# Patient Record
Sex: FEMALE | Race: WHITE | NOT HISPANIC OR LATINO | Employment: FULL TIME | URBAN - METROPOLITAN AREA
[De-identification: names, ages, dates, MRNs, and addresses within clinical notes are randomized per-mention and may not be internally consistent; named-entity substitution may affect disease eponyms.]

---

## 2023-04-08 LAB
ALBUMIN (MG/L) IN URINE: NORMAL
ALBUMIN/CREATININE (UG/MG) IN URINE: NORMAL
ANION GAP IN SER/PLAS: 12 MMOL/L (ref 10–20)
CALCIUM (MG/DL) IN SER/PLAS: 9.2 MG/DL (ref 8.6–10.6)
CARBON DIOXIDE, TOTAL (MMOL/L) IN SER/PLAS: 24 MMOL/L (ref 21–32)
CHLORIDE (MMOL/L) IN SER/PLAS: 103 MMOL/L (ref 98–107)
CHOLESTEROL (MG/DL) IN SER/PLAS: 231 MG/DL (ref 0–199)
CHOLESTEROL IN HDL (MG/DL) IN SER/PLAS: 87.7 MG/DL
CHOLESTEROL/HDL RATIO: 2.6
CREATININE (MG/DL) IN SER/PLAS: 0.75 MG/DL (ref 0.5–1.05)
CREATININE (MG/DL) IN URINE: NORMAL
ESTIMATED AVERAGE GLUCOSE FOR HBA1C: 131 MG/DL
GFR FEMALE: >90 ML/MIN/1.73M2
GLUCOSE (MG/DL) IN SER/PLAS: 233 MG/DL (ref 74–99)
HEMOGLOBIN A1C/HEMOGLOBIN TOTAL IN BLOOD: 6.2 %
LDL: 113 MG/DL (ref 0–99)
POTASSIUM (MMOL/L) IN SER/PLAS: 6.4 MMOL/L (ref 3.5–5.3)
SODIUM (MMOL/L) IN SER/PLAS: 133 MMOL/L (ref 136–145)
THYROTROPIN (MIU/L) IN SER/PLAS BY DETECTION LIMIT <= 0.05 MIU/L: 0.5 MIU/L (ref 0.44–3.98)
TRIGLYCERIDE (MG/DL) IN SER/PLAS: 151 MG/DL (ref 0–149)
UREA NITROGEN (MG/DL) IN SER/PLAS: 7 MG/DL (ref 6–23)
VLDL: 30 MG/DL (ref 0–40)

## 2023-04-10 LAB
ANION GAP IN SER/PLAS: 10 MMOL/L (ref 10–20)
CALCIUM (MG/DL) IN SER/PLAS: 9.6 MG/DL (ref 8.6–10.6)
CARBON DIOXIDE, TOTAL (MMOL/L) IN SER/PLAS: 27 MMOL/L (ref 21–32)
CHLORIDE (MMOL/L) IN SER/PLAS: 103 MMOL/L (ref 98–107)
CHOLESTEROL (MG/DL) IN SER/PLAS: 212 MG/DL (ref 0–199)
CHOLESTEROL IN HDL (MG/DL) IN SER/PLAS: 89.1 MG/DL
CHOLESTEROL/HDL RATIO: 2.4
CREATININE (MG/DL) IN SER/PLAS: 0.75 MG/DL (ref 0.5–1.05)
GFR FEMALE: >90 ML/MIN/1.73M2
GLUCOSE (MG/DL) IN SER/PLAS: 177 MG/DL (ref 74–99)
LDL: 103 MG/DL (ref 0–99)
POTASSIUM (MMOL/L) IN SER/PLAS: 4.1 MMOL/L (ref 3.5–5.3)
SODIUM (MMOL/L) IN SER/PLAS: 136 MMOL/L (ref 136–145)
THYROTROPIN (MIU/L) IN SER/PLAS BY DETECTION LIMIT <= 0.05 MIU/L: 0.41 MIU/L (ref 0.44–3.98)
TRIGLYCERIDE (MG/DL) IN SER/PLAS: 102 MG/DL (ref 0–149)
UREA NITROGEN (MG/DL) IN SER/PLAS: 14 MG/DL (ref 6–23)
VLDL: 20 MG/DL (ref 0–40)

## 2023-04-11 LAB
ESTIMATED AVERAGE GLUCOSE FOR HBA1C: 137 MG/DL
HEMOGLOBIN A1C/HEMOGLOBIN TOTAL IN BLOOD: 6.4 %

## 2023-11-07 PROBLEM — E10.9 TYPE 1 DIABETES MELLITUS (MULTI): Status: ACTIVE | Noted: 2023-11-07

## 2023-11-07 PROBLEM — R31.9 HEMATURIA: Status: ACTIVE | Noted: 2023-11-07

## 2023-11-07 PROBLEM — N94.6 DYSMENORRHEA: Status: ACTIVE | Noted: 2023-11-07

## 2023-11-07 PROBLEM — Z97.5 IUD (INTRAUTERINE DEVICE) IN PLACE: Status: ACTIVE | Noted: 2023-11-07

## 2023-11-07 RX ORDER — COPPER 313.4 MG/1
INTRAUTERINE DEVICE INTRAUTERINE
COMMUNITY

## 2023-11-07 RX ORDER — INSULIN LISPRO 100 [IU]/ML
INJECTION, SOLUTION INTRAVENOUS; SUBCUTANEOUS
COMMUNITY
Start: 2012-12-28 | End: 2023-11-08 | Stop reason: SDUPTHER

## 2023-11-07 RX ORDER — BLOOD SUGAR DIAGNOSTIC
STRIP MISCELLANEOUS
COMMUNITY
Start: 2021-05-03

## 2023-11-07 RX ORDER — INSULIN ASPART 100 [IU]/ML
INJECTION, SOLUTION INTRAVENOUS; SUBCUTANEOUS
COMMUNITY
Start: 2022-10-21 | End: 2023-11-08 | Stop reason: WASHOUT

## 2023-11-07 RX ORDER — PEN NEEDLE, DIABETIC 29 G X1/2"
NEEDLE, DISPOSABLE MISCELLANEOUS
COMMUNITY
Start: 2022-12-14 | End: 2023-11-08 | Stop reason: SDUPTHER

## 2023-11-07 RX ORDER — INSULIN DEGLUDEC 100 U/ML
INJECTION, SOLUTION SUBCUTANEOUS
COMMUNITY
Start: 2022-08-08 | End: 2023-11-08 | Stop reason: WASHOUT

## 2023-11-08 ENCOUNTER — OFFICE VISIT (OUTPATIENT)
Dept: ENDOCRINOLOGY | Facility: CLINIC | Age: 28
End: 2023-11-08
Payer: COMMERCIAL

## 2023-11-08 ENCOUNTER — LAB (OUTPATIENT)
Dept: LAB | Facility: LAB | Age: 28
End: 2023-11-08
Payer: COMMERCIAL

## 2023-11-08 VITALS
BODY MASS INDEX: 22.86 KG/M2 | DIASTOLIC BLOOD PRESSURE: 70 MMHG | HEIGHT: 63 IN | SYSTOLIC BLOOD PRESSURE: 122 MMHG | WEIGHT: 129 LBS

## 2023-11-08 DIAGNOSIS — Z13.29 SCREENING FOR THYROID DISORDER: ICD-10-CM

## 2023-11-08 DIAGNOSIS — E10.9 TYPE 1 DIABETES MELLITUS WITHOUT COMPLICATION (MULTI): ICD-10-CM

## 2023-11-08 PROBLEM — Z79.4 LONG-TERM INSULIN USE (MULTI): Status: ACTIVE | Noted: 2023-11-08

## 2023-11-08 LAB — VIT B12 SERPL-MCNC: 329 PG/ML (ref 211–911)

## 2023-11-08 PROCEDURE — 3074F SYST BP LT 130 MM HG: CPT | Performed by: INTERNAL MEDICINE

## 2023-11-08 PROCEDURE — 86364 TISS TRNSGLTMNASE EA IG CLAS: CPT

## 2023-11-08 PROCEDURE — 1036F TOBACCO NON-USER: CPT | Performed by: INTERNAL MEDICINE

## 2023-11-08 PROCEDURE — 86258 DGP ANTIBODY EACH IG CLASS: CPT

## 2023-11-08 PROCEDURE — 83036 HEMOGLOBIN GLYCOSYLATED A1C: CPT

## 2023-11-08 PROCEDURE — 82607 VITAMIN B-12: CPT

## 2023-11-08 PROCEDURE — 99204 OFFICE O/P NEW MOD 45 MIN: CPT | Performed by: INTERNAL MEDICINE

## 2023-11-08 PROCEDURE — 36415 COLL VENOUS BLD VENIPUNCTURE: CPT

## 2023-11-08 PROCEDURE — 95251 CONT GLUC MNTR ANALYSIS I&R: CPT | Performed by: INTERNAL MEDICINE

## 2023-11-08 PROCEDURE — 3044F HG A1C LEVEL LT 7.0%: CPT | Performed by: INTERNAL MEDICINE

## 2023-11-08 PROCEDURE — 86376 MICROSOMAL ANTIBODY EACH: CPT

## 2023-11-08 PROCEDURE — 3078F DIAST BP <80 MM HG: CPT | Performed by: INTERNAL MEDICINE

## 2023-11-08 RX ORDER — INSULIN GLARGINE 100 [IU]/ML
INJECTION, SOLUTION SUBCUTANEOUS EVERY 24 HOURS
COMMUNITY
End: 2023-11-08 | Stop reason: DRUGHIGH

## 2023-11-08 RX ORDER — BLOOD SUGAR DIAGNOSTIC
1 STRIP MISCELLANEOUS DAILY
Qty: 50 EACH | Refills: 10 | Status: SHIPPED | OUTPATIENT
Start: 2023-11-08 | End: 2024-11-07

## 2023-11-08 RX ORDER — INSULIN GLARGINE 100 [IU]/ML
20 INJECTION, SOLUTION SUBCUTANEOUS EVERY MORNING
Qty: 6 ML | Refills: 5 | Status: SHIPPED | OUTPATIENT
Start: 2023-11-08 | End: 2024-03-16 | Stop reason: SDUPTHER

## 2023-11-08 RX ORDER — CALCIUM CARB/VITAMIN D3/VIT K1 500-100-40
1 TABLET,CHEWABLE ORAL 3 TIMES DAILY
Qty: 100 EACH | Refills: 10 | Status: SHIPPED | OUTPATIENT
Start: 2023-11-08 | End: 2024-11-07

## 2023-11-08 RX ORDER — INSULIN LISPRO 100 [IU]/ML
INJECTION, SOLUTION INTRAVENOUS; SUBCUTANEOUS
Qty: 10 ML | Refills: 11 | Status: SHIPPED | OUTPATIENT
Start: 2023-11-08

## 2023-11-08 NOTE — PROGRESS NOTES
"  Subjective   Jennifer Hurst is a 28 y.o. female who presents for an initial visit for evaluation of Type 1 diabetes mellitus. The initial diagnosis of diabetes was made at age 12 years. The patient’s diagnosis of diabetes mellitus was based on a blood test obtain by her pediatrician which was markedly elevated.  Both of her grandmothers were diabetics but had type II disease.    She was previously seen an endocrinologist in [place] but they have since retired.    Known complications due to diabetes included none    Cardiovascular risk factors include diabetes mellitus. The patient is not on an ACE inhibitor or angiotensin II receptor blocker.  The patient has not been previously hospitalized due to diabetic ketoacidosis.     Current symptoms/problems include none. Her clinical course has been stable.     Current diabetes regimen:  Insulin glargine 28 units daily  NovoLog 1 unit for every 10 g of carbs plus correction scale     The patient is currently checking the blood glucose up to 34 times per day.    Patient is using: continuous glucose monitor  [employer] ALY CGM DATA:  Average 141 mg/dL  8% of values below target range  69% of values within target range  23% of values above target range    Hypoglycemia frequency: 8%  Hypoglycemia awareness: Yes     MEALS:  Breakfast - yogurt, banna   Lunch - salad, leftovers   Dinner - tacos, burgers, pasta, soup, garlic bread, chckien   Snacks - frutit, goldfish crackers   Beverages- coffee, tea with creamer     Noctuira x 1     She is not keen on insulin pump therapy    She is currently engaged and will get  in 2025    Exercise regimen - walks dog for 60 minutes     Objective   /70 (BP Location: Left arm, Patient Position: Sitting, BP Cuff Size: Adult)   Ht 1.6 m (5' 3\")   Wt 58.5 kg (129 lb)   BMI 22.85 kg/m²   Physical Exam  Vitals and nursing note reviewed.   Constitutional:       General: She is not in acute distress.     Appearance: Normal " "appearance. She is normal weight.   HENT:      Head: Normocephalic and atraumatic.      Nose: Nose normal.      Mouth/Throat:      Mouth: Mucous membranes are moist.   Eyes:      Extraocular Movements: Extraocular movements intact.   Neck:      Thyroid: No thyromegaly or thyroid tenderness.   Cardiovascular:      Rate and Rhythm: Normal rate and regular rhythm.   Pulmonary:      Effort: Pulmonary effort is normal.      Breath sounds: Normal breath sounds.   Musculoskeletal:         General: Normal range of motion.   Skin:     General: Skin is warm.   Neurological:      Mental Status: She is alert and oriented to person, place, and time.   Psychiatric:         Mood and Affect: Mood normal.         Lab Review  Lab Results   Component Value Date    HGBA1C 6.4 (A) 04/10/2023     Lab Results   Component Value Date    GLUCOSE 177 (H) 04/10/2023    CALCIUM 9.6 04/10/2023     04/10/2023    K 4.1 04/10/2023    CO2 27 04/10/2023     04/10/2023    BUN 14 04/10/2023    CREATININE 0.75 04/10/2023     Lab Results   Component Value Date    CHOL 212 (H) 04/10/2023    CHOL 231 (H) 04/08/2023    CHOL 231 (H) 06/01/2019     Lab Results   Component Value Date    HDL 89.1 04/10/2023    HDL 87.7 04/08/2023    HDL 78.1 06/01/2019     No results found for: \"LDLCALC\"  Lab Results   Component Value Date    TRIG 102 04/10/2023    TRIG 151 (H) 04/08/2023    TRIG 248 (H) 06/01/2019         Health Maintenance:   Foot Exam:   Eye Exam:  December 2022  Lipid Panel:  April 8, 2023  Urine Albumin:    Assessment/Plan   28-year-old female presents for the evaluation for the management of type 1 diabetes. Her blood pressure is at goal today.    Type 1 diabetes mellitus (CMS/[facility])  Insulin Glargine 28 units subcutaneous daily   Novolog 1 unit for every 10 grams  Correction scale 1 unit for every 50mg above 150mg/dL   Please wear a medical alert at all times   To continue the use of your CGM for the intensive glucose monitoring due to the " dynamic nature of insulin requirements, the narrow therapeutic index of insulin and the potentially fatal consequences of treatment  Counseled that the degree of allowance for hypoglycemia is 4%  To obtain blood and urine tests     Long-term insulin use (CMS/[facility])  Please rotate insulin injection sites     For follow up in 4 months

## 2023-11-08 NOTE — PATIENT INSTRUCTIONS
Thank you for choosing [facility]  for your health care needs.  If you have any questions, concerns or medical needs, please feel free to contact our office at [phone].    Please ensure you complete your blood work one week before the next scheduled appointment.    Insulin Glargine 28 units subcutaneous daily   Novolog 1 unit for every 10 grams  Correction scale 1 unit for every 50mg above 150mg/dL   Please wear a medical alert at all times   To continue the use of your CGM for the intensive glucose monitoring due to the dynamic nature of insulin requirements, the narrow therapeutic index of insulin and the potentially fatal consequences of treatment  To obtain blood and urine tests   Counseled that the goals of diabetic care will change in pregnancy   For follow up in 4 months

## 2023-11-09 ENCOUNTER — TELEPHONE (OUTPATIENT)
Dept: ENDOCRINOLOGY | Facility: CLINIC | Age: 28
End: 2023-11-09
Payer: COMMERCIAL

## 2023-11-09 LAB
EST. AVERAGE GLUCOSE BLD GHB EST-MCNC: 134 MG/DL
GLIADIN PEPTIDE IGA SER IA-ACNC: <1 U/ML
GLIADIN PEPTIDE IGG SER IA-ACNC: <1 U/ML
HBA1C MFR BLD: 6.3 %
THYROPEROXIDASE AB SERPL-ACNC: 303 IU/ML
TTG IGA SER IA-ACNC: <1 U/ML
TTG IGG SER IA-ACNC: <1 U/ML

## 2023-11-09 NOTE — ASSESSMENT & PLAN NOTE
Insulin Glargine 28 units subcutaneous daily   Novolog 1 unit for every 10 grams  Correction scale 1 unit for every 50mg above 150mg/dL   Please wear a medical alert at all times   To continue the use of your CGM for the intensive glucose monitoring due to the dynamic nature of insulin requirements, the narrow therapeutic index of insulin and the potentially fatal consequences of treatment  Counseled that the degree of allowance for hypoglycemia is 4%  To obtain blood and urine tests

## 2023-11-09 NOTE — TELEPHONE ENCOUNTER
Client services called to advise that they could not run the urine albumin specimen.  There was not enough urine to run the test.

## 2023-11-10 ENCOUNTER — TELEPHONE (OUTPATIENT)
Dept: ENDOCRINOLOGY | Facility: CLINIC | Age: 28
End: 2023-11-10
Payer: COMMERCIAL

## 2023-11-10 NOTE — TELEPHONE ENCOUNTER
----- Message from Jordan Woodard MD sent at 11/10/2023  5:58 AM EST -----  Labs have been reviewed.  The A1C was found to be 6.3%.  Testing for celiac disease was negative. Testing for Hashimoto's thyroiditis was positive.  This predisposes to developing hypothyroidism over time.  Thyroid levels will continue to be monitored serially.  For follow up as scheduled.

## 2023-11-10 NOTE — TELEPHONE ENCOUNTER
Result Communication    Resulted Orders   Hemoglobin A1C   Result Value Ref Range    Hemoglobin A1C 6.3 (H) see below %    Estimated Average Glucose 134 Not Established mg/dL    Narrative    Diagnosis of Diabetes-Adults  Non-Diabetic: < or = 5.6%  Increased risk for developing diabetes: 5.7-6.4%  Diagnostic of diabetes: > or = 6.5%    Monitoring of Diabetes  Age (y)....................... Therapeutic Goal (%)  Adults: >18.........................<7.0  Pediatrics: 13-18...................<7.5  Pediatrics: 7-12....................<8.0  Pediatrics: 0-6..................... 7.5-8.5    American Diabetes Association. Diabetes Care 33(S1), Jan 2010       Celiac Panel   Result Value Ref Range    Tissue Transglutaminase, IgA <1.0 <15.0 U/mL      Comment:      Celiac disease is unlikely. False negative Tissue  Transglutaminase  Antibody, IgA results can occur in  approximately 10% of patients with celiac disease,  patients already adhering to a gluten-free diet, or  patients with IgA deficiency.    Tissue Transglutamase IgG <1.0 <15.0 U/mL      Comment:      False negative Tissue Transglutaminase Antibody, IgG   results can occur in patients already adhering to a   gluten-free diet. Tissue Transglutaminase Antibody,   IgA is the preferred test for screening patients with   suspected Celiac Disease.    Deamidated Gliadin Antibody IgA <1.0 <15.0 U/mL      Comment:      False negative Deamidated Gliadin Peptide Antibody, IgA   results can occur in patients already adhering to a   gluten-free diet or patients with IgA deficiency.   Tissue Transglutaminase Antibody, IgA is the preferred   test for screening patients with suspected Celiac Disease.            Deamidated Gliadin Antibody IgG <1.0 <15.0 U/mL      Comment:      False negative Deamidated Gliadin Peptide Antibody,   IgG results can occur in patients already adhering   to a gluten-free diet. Tissue Transglutaminase   Antibody, IgA is the preferred test for screening    patients with suspected Celiac Disease.   Vitamin B12   Result Value Ref Range    Vitamin B12 329 211 - 911 pg/mL   TPO antibody   Result Value Ref Range    Thyroid Peroxidase (TPO) Antibody 303 (H) <=60 IU/mL    Narrative    Negative: <=60 U/mL  Positive:  >60 U/mL       2:03 PM      Results were successfully communicated with the patient and they acknowledged their understanding.

## 2023-11-10 NOTE — RESULT ENCOUNTER NOTE
Labs have been reviewed.  The A1C was found to be 6.3%.  Testing for celiac disease was negative. Testing for Hashimoto's thyroiditis was positive.  This predisposes to developing hypothyroidism over time.  Thyroid levels will continue to be monitored serially.  For follow up as scheduled.

## 2023-11-17 NOTE — TELEPHONE ENCOUNTER
Patient previously requested that Dr. Woodard contact her directly to further explain her recent diagnosis. She would also like to know if she will be starting medication at this time. She states medication was discussed at the time of her appointment but was not mentioned when lab results were relayed to her. She says she has called twice since last week with no response.

## 2023-11-22 ENCOUNTER — TELEPHONE (OUTPATIENT)
Dept: ENDOCRINOLOGY | Facility: CLINIC | Age: 28
End: 2023-11-22
Payer: COMMERCIAL

## 2023-12-05 NOTE — TELEPHONE ENCOUNTER
Patient called.  NO medication warranted as of yet as TSH values have been normal.  Thyroid replacement therapy warranted if the TSH starts to elevate, conferring a state of hypothyroidism.    Understanding expressed.

## 2023-12-08 ENCOUNTER — OFFICE VISIT (OUTPATIENT)
Dept: PRIMARY CARE | Facility: CLINIC | Age: 28
End: 2023-12-08
Payer: COMMERCIAL

## 2023-12-08 ENCOUNTER — LAB (OUTPATIENT)
Dept: LAB | Facility: LAB | Age: 28
End: 2023-12-08
Payer: COMMERCIAL

## 2023-12-08 VITALS
BODY MASS INDEX: 23.39 KG/M2 | WEIGHT: 132 LBS | HEART RATE: 84 BPM | RESPIRATION RATE: 16 BRPM | SYSTOLIC BLOOD PRESSURE: 118 MMHG | TEMPERATURE: 97.9 F | DIASTOLIC BLOOD PRESSURE: 80 MMHG | HEIGHT: 63 IN

## 2023-12-08 DIAGNOSIS — R76.8 ANTI-TPO ANTIBODIES PRESENT: Primary | ICD-10-CM

## 2023-12-08 DIAGNOSIS — E10.9 TYPE 1 DIABETES MELLITUS WITHOUT COMPLICATION (MULTI): ICD-10-CM

## 2023-12-08 DIAGNOSIS — R76.8 ANTI-TPO ANTIBODIES PRESENT: ICD-10-CM

## 2023-12-08 LAB
T4 FREE SERPL-MCNC: 0.79 NG/DL (ref 0.61–1.12)
TSH SERPL-ACNC: 0.8 MIU/L (ref 0.44–3.98)

## 2023-12-08 PROCEDURE — 84481 FREE ASSAY (FT-3): CPT

## 2023-12-08 PROCEDURE — 99214 OFFICE O/P EST MOD 30 MIN: CPT | Performed by: FAMILY MEDICINE

## 2023-12-08 PROCEDURE — 84439 ASSAY OF FREE THYROXINE: CPT

## 2023-12-08 PROCEDURE — 84443 ASSAY THYROID STIM HORMONE: CPT

## 2023-12-08 PROCEDURE — 3044F HG A1C LEVEL LT 7.0%: CPT | Performed by: FAMILY MEDICINE

## 2023-12-08 PROCEDURE — 36415 COLL VENOUS BLD VENIPUNCTURE: CPT

## 2023-12-08 PROCEDURE — 3074F SYST BP LT 130 MM HG: CPT | Performed by: FAMILY MEDICINE

## 2023-12-08 PROCEDURE — 3079F DIAST BP 80-89 MM HG: CPT | Performed by: FAMILY MEDICINE

## 2023-12-08 PROCEDURE — 1036F TOBACCO NON-USER: CPT | Performed by: FAMILY MEDICINE

## 2023-12-08 RX ORDER — BLOOD-GLUCOSE SENSOR
EACH MISCELLANEOUS
COMMUNITY
Start: 2023-11-08

## 2023-12-08 NOTE — PROGRESS NOTES
Subjective   Patient ID: Jennifer Hurst is a 28 y.o. female who presents for New Patient Visit (New to est/Seen endo for Hashimoto's ).  HPI  Patient with hx of type 1 diabetes   Here to establish care   She tested positive to TPO antibodies and she is worried about hashimoto.       Billcecilio sepcialist.       Review of Systems    Past Medical History:   Diagnosis Date    Encounter for gynecological examination (general) (routine) without abnormal findings 02/07/2018    Well female exam with routine gynecological exam    Pain in unspecified foot 06/15/2016    Foot pain    Personal history of other diseases of the nervous system and sense organs     History of migraine    Personal history of other diseases of the respiratory system     History of bronchitis    Personal history of other endocrine, nutritional and metabolic disease     History of diabetes mellitus    Personal history of other infectious and parasitic diseases     History of chickenpox    Toxic effect of contact with other venomous marine animals, accidental (unintentional), initial encounter 05/31/2016    Marine animal sting       History reviewed. No pertinent surgical history.   Social History     Socioeconomic History    Marital status: Single     Spouse name: None    Number of children: None    Years of education: None    Highest education level: None   Occupational History    None   Tobacco Use    Smoking status: Never     Passive exposure: Never    Smokeless tobacco: Never   Substance and Sexual Activity    Alcohol use: Yes     Alcohol/week: 2.0 standard drinks of alcohol     Types: 2 Glasses of wine per week     Comment: occasionally    Drug use: Not Currently    Sexual activity: None   Other Topics Concern    None   Social History Narrative    None     Social Determinants of Health     Financial Resource Strain: Not on file   Food Insecurity: Not on file   Transportation Needs: Not on file   Physical Activity: Not on file   Stress: Not on file  "  Social Connections: Not on file   Intimate Partner Violence: Not on file   Housing Stability: Not on file      Family History   Problem Relation Name Age of Onset    Anemia Mother      Prostate cancer Maternal Grandfather      Diabetes Paternal Grandmother      Prostate cancer Other grandfather        MEDICATIONS AND ALLERGIES:    ALLERGIES Cephalexin    MEDICATIONS   Current Outpatient Medications on File Prior to Visit   Medication Sig Dispense Refill    FreeStyle Pretty 3 Sensor device Use as instructed. Change every 14 days      FreeStyle Pretty sensor system kit Use as instructed.  Change every 14 days 2 each 11    HumaLOG U-100 Insulin 100 unit/mL injection Inject 1 unit for every 10 grams.  Max of 50 units per day 10 mL 11    insulin glargine (Lantus) 100 unit/mL (3 mL) pen Inject 20 Units under the skin once daily in the morning. 6 mL 5    insulin syringe-needle U-100 (BD Insulin Syringe Ultra-Fine) 31G X 5/16\" 0.3 mL syringe Inject 1 each under the skin 3 times a day. 100 each 10    OneTouch Ultra Test strip USE 1 STRIP THREE TIMES A DAY      pen needle, diabetic 32 gauge x 3/16\" needle 1 each once daily. 50 each 10    copper (ParaGard T 380A) 380 square mm IUD Paragard Intrauterine Copper Intrauterine Intrauterine Device   Refills: 0       Active       No current facility-administered medications on file prior to visit.        Objective   Visit Vitals  Resp 16   Ht 1.6 m (5' 3\")   Wt 59.9 kg (132 lb)   BMI 23.38 kg/m²   Smoking Status Never   BSA 1.63 m²      Physical Exam  Constitutional:       Appearance: Normal appearance.   HENT:      Head: Normocephalic and atraumatic.   Eyes:      Pupils: Pupils are equal, round, and reactive to light.   Cardiovascular:      Rate and Rhythm: Normal rate.   Pulmonary:      Effort: Pulmonary effort is normal.   Musculoskeletal:         General: No swelling.      Cervical back: Normal range of motion.   Skin:     Coloration: Skin is not jaundiced.   Neurological:      " General: No focal deficit present.      Mental Status: She is alert and oriented to person, place, and time.             1. Anti-TPO antibodies present  Will rechecek thyroid function   If normal she will need periodic checks   We provided reading information   This can be positive in patient with type one diabetes 30-40%   - TSH; Future  - Thyroxine, Free; Future  - Triiodothyronine, Free; Future    2. Type 1 diabetes mellitus without complication (CMS/HCC)  Since she was 12   Well controlled   Following with endocirniolgoy

## 2023-12-09 LAB — T3FREE SERPL-MCNC: 3.6 PG/ML (ref 2.3–4.2)

## 2023-12-11 ENCOUNTER — TELEPHONE (OUTPATIENT)
Dept: PRIMARY CARE | Facility: CLINIC | Age: 28
End: 2023-12-11
Payer: COMMERCIAL

## 2023-12-11 NOTE — TELEPHONE ENCOUNTER
----- Message from Mat Grove MD sent at 12/11/2023  8:20 AM EST -----  Thyroid function normal   No indication that her symptoms are related to Hashimoto's , recommend recheck level in 3 month or if she develops new symptoms.

## 2024-03-14 NOTE — PATIENT INSTRUCTIONS
Thank you for choosing [facility]  for your health care needs.  If you have any questions, concerns or medical needs, please feel free to contact our office at [phone].    Please ensure you complete your blood work one week before the next scheduled appointment.    To decrease Insulin Glargine to 26  units subcutaneous daily   Novolog 1 unit for every 10 grams  Correction scale 1 unit for every 50mg above 150mg/dL   To continue the use of your CGM for the intensive glucose monitoring due to the dynamic nature of insulin requirements, the narrow therapeutic index of insulin and the potentially fatal consequences of treatment  For follow up in 4 months

## 2024-03-14 NOTE — PROGRESS NOTES
"  Subjective   Jennifer Hurst is a 28 y.o. female who presents for follow up for Type 1 diabetes mellitus. The initial diagnosis of diabetes was made at age 12 years. The patient’s diagnosis of diabetes mellitus was based on a blood test obtain by her pediatrician which was markedly elevated.  Both of her grandmothers were diabetics but had type II disease.    She has had no major changes to her health since her last appointment.    Known complications due to diabetes included none    Cardiovascular risk factors include diabetes mellitus. The patient is not on an ACE inhibitor or angiotensin II receptor blocker.  The patient has not been previously hospitalized due to diabetic ketoacidosis.     Current symptoms/problems include none. Her clinical course has been stable.     Current diabetes regimen:  Insulin glargine 28 units daily  NovoLog 1 unit for every 10 g of carbs plus correction scale     The patient is currently checking the blood glucose multiple times per day.    Patient is using: continuous glucose monitor                                                      Hypoglycemia frequency: 6%  Hypoglycemia awareness: Yes     MEALS:  Breakfast - yogurt, banna   Lunch - salad, leftovers   Dinner - tacos, burgers, pasta, soup, garlic bread, chckien, rice, pasta   Snacks - frutit, goldfish crackers   Beverages- coffee, tea with creamer     She is not keen on insulin pump therapy    She is currently engaged and will get  in 2025    Exercise regimen -walks dog    Review of Systems   Genitourinary:         Nocturia x 1    All other systems reviewed and are negative.    Objective   /64 (BP Location: Left arm, Patient Position: Sitting, BP Cuff Size: Adult)   Pulse 89   Ht 1.6 m (5' 3\")   Wt 59.9 kg (132 lb)   BMI 23.38 kg/m²   Physical Exam  Vitals and nursing note reviewed.   Constitutional:       General: She is not in acute distress.     Appearance: Normal appearance. She is normal weight.   HENT: " "     Head: Normocephalic and atraumatic.      Nose: Nose normal.      Mouth/Throat:      Mouth: Mucous membranes are moist.   Eyes:      Extraocular Movements: Extraocular movements intact.   Neck:      Thyroid: No thyromegaly or thyroid tenderness.   Cardiovascular:      Rate and Rhythm: Normal rate and regular rhythm.   Pulmonary:      Effort: Pulmonary effort is normal.      Breath sounds: Normal breath sounds.   Musculoskeletal:         General: Normal range of motion.   Skin:     General: Skin is warm.   Neurological:      Mental Status: She is alert and oriented to person, place, and time.   Psychiatric:         Mood and Affect: Mood normal.         Lab Review  Lab Results   Component Value Date    HGBA1C 6.0 03/15/2024     Lab Results   Component Value Date    GLUCOSE 177 (H) 04/10/2023    CALCIUM 9.6 04/10/2023     04/10/2023    K 4.1 04/10/2023    CO2 27 04/10/2023     04/10/2023    BUN 14 04/10/2023    CREATININE 0.75 04/10/2023     Lab Results   Component Value Date    CHOL 212 (H) 04/10/2023    CHOL 231 (H) 04/08/2023    CHOL 231 (H) 06/01/2019     Lab Results   Component Value Date    HDL 89.1 04/10/2023    HDL 87.7 04/08/2023    HDL 78.1 06/01/2019     No results found for: \"LDLCALC\"  Lab Results   Component Value Date    TRIG 102 04/10/2023    TRIG 151 (H) 04/08/2023    TRIG 248 (H) 06/01/2019         Health Maintenance:   Foot Exam: December 2023  Eye Exam:  December 2022  Urine Albumin: April 8, 2023    CGM Interpretation/Plan   14 day CGM download was reviewed in detail as documented above and will be attached to chart.  A minimum of 72 hours of glucose data was used to inform the management plan outlined below.  61% of values is spent within target range.  6% of values is hypoglycemic.  Hypoglycemia can occur overnight or 1-2 hours following a correction for high blood sugar.    Assessment/Plan   28-year-old female presents for follow up for type 1 diabetes. Her blood pressure is at " goal today.    Type 1 diabetes mellitus (CMS/[facility])  To decrease Insulin Glargine to 26  units subcutaneous daily   Novolog 1 unit for every 10 grams  Correction scale 1 unit for every 50mg above 150mg/dL   To continue the use of your CGM for the intensive glucose monitoring due to the dynamic nature of insulin requirements, the narrow therapeutic index of insulin and the potentially fatal consequences of treatment  Counseled that the time in range should be >70%  Counseled that the degree of allowance for hypoglycemia should be 4% or less  Counseled that we would not want to achieve a very good A1c value at the expense of recurrent hypoglycemia      Long-term insulin use (CMS/[facility])  Please rotate insulin injection sites        Hashimoto's thyroiditis  Counseled that the antibody was positive for Hashimoto's thyroiditis  Counseled that thyroid replacement therapy is not warranted at this time given normal TSH values  Counseled that there is a predisposition to developing overt hypothyroidism over time    For follow up in 4 months

## 2024-03-15 ENCOUNTER — OFFICE VISIT (OUTPATIENT)
Dept: ENDOCRINOLOGY | Facility: CLINIC | Age: 29
End: 2024-03-15
Payer: COMMERCIAL

## 2024-03-15 VITALS
WEIGHT: 132 LBS | DIASTOLIC BLOOD PRESSURE: 64 MMHG | HEIGHT: 63 IN | SYSTOLIC BLOOD PRESSURE: 102 MMHG | BODY MASS INDEX: 23.39 KG/M2 | HEART RATE: 89 BPM

## 2024-03-15 DIAGNOSIS — E10.9 TYPE 1 DIABETES MELLITUS WITHOUT COMPLICATION (MULTI): Primary | ICD-10-CM

## 2024-03-15 LAB — POC HEMOGLOBIN A1C: 6 % (ref 4.2–6.5)

## 2024-03-15 PROCEDURE — 1036F TOBACCO NON-USER: CPT | Performed by: INTERNAL MEDICINE

## 2024-03-15 PROCEDURE — 3078F DIAST BP <80 MM HG: CPT | Performed by: INTERNAL MEDICINE

## 2024-03-15 PROCEDURE — 99214 OFFICE O/P EST MOD 30 MIN: CPT | Performed by: INTERNAL MEDICINE

## 2024-03-15 PROCEDURE — 95251 CONT GLUC MNTR ANALYSIS I&R: CPT | Performed by: INTERNAL MEDICINE

## 2024-03-15 PROCEDURE — 83036 HEMOGLOBIN GLYCOSYLATED A1C: CPT | Performed by: INTERNAL MEDICINE

## 2024-03-15 PROCEDURE — 3074F SYST BP LT 130 MM HG: CPT | Performed by: INTERNAL MEDICINE

## 2024-03-16 PROBLEM — E06.3 HASHIMOTO'S THYROIDITIS: Status: ACTIVE | Noted: 2024-03-16

## 2024-03-16 RX ORDER — INSULIN GLARGINE 100 [IU]/ML
26 INJECTION, SOLUTION SUBCUTANEOUS EVERY MORNING
Qty: 10 EACH | Refills: 5 | Status: SHIPPED | OUTPATIENT
Start: 2024-03-16 | End: 2024-09-12

## 2024-03-16 NOTE — ASSESSMENT & PLAN NOTE
Counseled that the antibody was positive for Hashimoto's thyroiditis  Counseled that thyroid replacement therapy is not warranted at this time given normal TSH values  Counseled that there is a predisposition to developing overt hypothyroidism over time    For follow up in 4 months

## 2024-03-16 NOTE — ASSESSMENT & PLAN NOTE
To decrease Insulin Glargine to 26  units subcutaneous daily   Novolog 1 unit for every 10 grams  Correction scale 1 unit for every 50mg above 150mg/dL   To continue the use of your CGM for the intensive glucose monitoring due to the dynamic nature of insulin requirements, the narrow therapeutic index of insulin and the potentially fatal consequences of treatment  Counseled that the time in range should be >70%  Counseled that the degree of allowance for hypoglycemia should be 4% or less  Counseled that we would not want to achieve a very good A1c value at the expense of recurrent hypoglycemia

## 2024-03-18 DIAGNOSIS — E10.9 TYPE 1 DIABETES MELLITUS WITHOUT COMPLICATION (MULTI): ICD-10-CM

## 2024-03-18 RX ORDER — INSULIN GLARGINE 100 [IU]/ML
26 INJECTION, SOLUTION SUBCUTANEOUS NIGHTLY
Qty: 10 EACH | Refills: 0 | Status: SHIPPED | OUTPATIENT
Start: 2024-03-18 | End: 2025-03-18

## 2024-03-18 NOTE — TELEPHONE ENCOUNTER
Lm for patient to return call to schedule   Received fax from [employer] indicating that Insulin Glargine is not covered by her insurance.  Lantus Solostar brand name is listed as covered.  Pended for GISSELL Cage.

## 2024-06-28 DIAGNOSIS — E10.9 TYPE 1 DIABETES MELLITUS WITHOUT COMPLICATION (MULTI): ICD-10-CM

## 2024-06-28 RX ORDER — INSULIN GLARGINE 100 [IU]/ML
26 INJECTION, SOLUTION SUBCUTANEOUS NIGHTLY
Qty: 10 EACH | Refills: 0 | Status: SHIPPED | OUTPATIENT
Start: 2024-06-28 | End: 2024-07-28

## 2024-08-02 ENCOUNTER — APPOINTMENT (OUTPATIENT)
Dept: ENDOCRINOLOGY | Facility: CLINIC | Age: 29
End: 2024-08-02
Payer: COMMERCIAL

## 2024-08-02 VITALS
HEART RATE: 86 BPM | DIASTOLIC BLOOD PRESSURE: 66 MMHG | SYSTOLIC BLOOD PRESSURE: 104 MMHG | WEIGHT: 130 LBS | HEIGHT: 63 IN | BODY MASS INDEX: 23.04 KG/M2

## 2024-08-02 DIAGNOSIS — E10.9 TYPE 1 DIABETES MELLITUS WITHOUT COMPLICATION (MULTI): Primary | ICD-10-CM

## 2024-08-02 DIAGNOSIS — Z79.4 LONG-TERM INSULIN USE (MULTI): ICD-10-CM

## 2024-08-02 DIAGNOSIS — E06.3 HASHIMOTO'S THYROIDITIS: ICD-10-CM

## 2024-08-02 PROCEDURE — 95251 CONT GLUC MNTR ANALYSIS I&R: CPT | Performed by: INTERNAL MEDICINE

## 2024-08-02 PROCEDURE — 3078F DIAST BP <80 MM HG: CPT | Performed by: INTERNAL MEDICINE

## 2024-08-02 PROCEDURE — 1036F TOBACCO NON-USER: CPT | Performed by: INTERNAL MEDICINE

## 2024-08-02 PROCEDURE — 3074F SYST BP LT 130 MM HG: CPT | Performed by: INTERNAL MEDICINE

## 2024-08-02 PROCEDURE — 99214 OFFICE O/P EST MOD 30 MIN: CPT | Performed by: INTERNAL MEDICINE

## 2024-08-02 PROCEDURE — 3008F BODY MASS INDEX DOCD: CPT | Performed by: INTERNAL MEDICINE

## 2024-08-02 RX ORDER — INSULIN GLARGINE 100 [IU]/ML
26 INJECTION, SOLUTION SUBCUTANEOUS EVERY MORNING
Qty: 10 EACH | Refills: 5 | Status: SHIPPED | OUTPATIENT
Start: 2024-08-02 | End: 2025-01-29

## 2024-08-02 NOTE — PROGRESS NOTES
"  Subjective   Jennifer Hurst is a 29 y.o. female who presents for follow up for Type 1 diabetes mellitus. The initial diagnosis of diabetes was made at age 12 years. The patient’s diagnosis of diabetes mellitus was based on a blood test obtain by her pediatrician which was markedly elevated.  Both of her grandmothers were diabetics but had type II disease.    She has had no major changes to her health since her last appointment.    Known complications due to diabetes included none    Cardiovascular risk factors include diabetes mellitus. The patient is not on an ACE inhibitor or angiotensin II receptor blocker.  The patient has not been previously hospitalized due to diabetic ketoacidosis.     Current symptoms/problems include none. Her clinical course has been stable.     Current diabetes regimen:  Insulin glargine 28 units daily  NovoLog 1 unit for every 10 g of carbs plus correction scale     The patient is currently checking the blood glucose multiple times per day.    Patient is using: continuous glucose monitor                                                              Hypoglycemia frequency: 8% (increased from 6%)  Hypoglycemia awareness: Yes     MEALS:  Breakfast - half bagel, banna   Lunch - toast, salad, leftovers   Dinner - chicken, vegetable, rice  Snacks - aplle, peach   Beverages- coffee, tea with creamer     She is not keen on insulin pump therapy    Exercise regimen -3-4 days per week; 1 hour walk with dog, stair  stepper     Review of Systems   All other systems reviewed and are negative.    Objective   /66 (BP Location: Left arm, Patient Position: Sitting, BP Cuff Size: Adult)   Pulse 86   Ht 1.6 m (5' 3\")   Wt 59 kg (130 lb)   BMI 23.03 kg/m²   Physical Exam  Vitals and nursing note reviewed.   Constitutional:       General: She is not in acute distress.     Appearance: Normal appearance. She is normal weight.   HENT:      Head: Normocephalic and atraumatic.      Nose: Nose normal. " "     Mouth/Throat:      Mouth: Mucous membranes are moist.   Eyes:      Extraocular Movements: Extraocular movements intact.   Neck:      Thyroid: No thyromegaly or thyroid tenderness.   Cardiovascular:      Rate and Rhythm: Normal rate and regular rhythm.   Pulmonary:      Effort: Pulmonary effort is normal.      Breath sounds: Normal breath sounds.   Musculoskeletal:         General: Normal range of motion.      Right foot: Normal range of motion. No deformity, bunion or Charcot foot.      Left foot: Normal range of motion. No deformity, bunion or Charcot foot.   Feet:      Right foot:      Skin integrity: Skin integrity normal. No ulcer, blister, skin breakdown or erythema.      Toenail Condition: Right toenails are normal.      Left foot:      Skin integrity: Skin integrity normal. No ulcer, blister, skin breakdown or erythema.      Toenail Condition: Left toenails are normal.   Skin:     General: Skin is warm.   Neurological:      Mental Status: She is alert and oriented to person, place, and time.   Psychiatric:         Mood and Affect: Mood normal.         Lab Review  Lab Results   Component Value Date    HGBA1C 6.0 03/15/2024     Lab Results   Component Value Date    GLUCOSE 177 (H) 04/10/2023    CALCIUM 9.6 04/10/2023     04/10/2023    K 4.1 04/10/2023    CO2 27 04/10/2023     04/10/2023    BUN 14 04/10/2023    CREATININE 0.75 04/10/2023     Lab Results   Component Value Date    CHOL 212 (H) 04/10/2023    CHOL 231 (H) 04/08/2023    CHOL 231 (H) 06/01/2019     Lab Results   Component Value Date    HDL 89.1 04/10/2023    HDL 87.7 04/08/2023    HDL 78.1 06/01/2019     No results found for: \"LDLCALC\"  Lab Results   Component Value Date    TRIG 102 04/10/2023    TRIG 151 (H) 04/08/2023    TRIG 248 (H) 06/01/2019         Health Maintenance:   Foot Exam: December 2023  Eye Exam:  December 2022  Urine Albumin: April 8, 2023    CGM Interpretation/Plan   14 day CGM download was reviewed in detail as " documented above and will be attached to chart.  A minimum of 72 hours of glucose data was used to inform the management plan outlined below.  64% of values is spent within target range, which is an increase from 61%.   There is increasing hypoglycemia.      Assessment/Plan   29-year-old female presents for follow up for type 1 diabetes. Her blood pressure is at goal today.    Type 1 diabetes mellitus (Multi)  To decrease Insulin Glargine to 26  units subcutaneous daily   Novolog 1 unit for every 10 grams with breakfast and dinner; change to 1:8g  Correction scale 1 unit for every 50mg above 150mg/dL   To continue the use of your CGM for the intensive glucose monitoring due to the dynamic nature of insulin requirements, the narrow therapeutic index of insulin and the potentially fatal consequences of treatment  Counseled that the time in range should be >70%  To obtain blood tests       Long-term insulin use (Multi)  Please rotate insulin injection sites      Hashimoto's thyroiditis  Anti-TPO noted to be positive  Biochemically euthyroid  To obtain TFTs    For follow up in 4-5 months

## 2024-08-02 NOTE — PATIENT INSTRUCTIONS
Thank you for choosing [facility]  for your health care needs.  If you have any questions, concerns or medical needs, please feel free to contact our office at [phone].    Please ensure you complete your blood work one week before the next scheduled appointment.    To decrease Insulin Glargine to 26  units subcutaneous daily   Novolog 1 unit for every 10 grams with breakfast and dinner; change to 1:8g  Correction scale 1 unit for every 50mg above 150mg/dL   To continue the use of your CGM for the intensive glucose monitoring due to the dynamic nature of insulin requirements, the narrow therapeutic index of insulin and the potentially fatal consequences of treatment  For follow up in 4-5 months

## 2024-08-04 NOTE — ASSESSMENT & PLAN NOTE
To decrease Insulin Glargine to 26  units subcutaneous daily   Novolog 1 unit for every 10 grams with breakfast and dinner; change to 1:8g  Correction scale 1 unit for every 50mg above 150mg/dL   To continue the use of your CGM for the intensive glucose monitoring due to the dynamic nature of insulin requirements, the narrow therapeutic index of insulin and the potentially fatal consequences of treatment  Counseled that the time in range should be >70%  To obtain blood tests

## 2024-08-04 NOTE — ASSESSMENT & PLAN NOTE
Anti-TPO noted to be positive  Biochemically euthyroid  To obtain TFTs    For follow up in 4-5 months

## 2024-10-22 ENCOUNTER — LAB (OUTPATIENT)
Dept: LAB | Facility: LAB | Age: 29
End: 2024-10-22
Payer: COMMERCIAL

## 2024-10-22 ENCOUNTER — APPOINTMENT (OUTPATIENT)
Dept: OBSTETRICS AND GYNECOLOGY | Facility: CLINIC | Age: 29
End: 2024-10-22
Payer: COMMERCIAL

## 2024-10-22 VITALS
HEIGHT: 63 IN | DIASTOLIC BLOOD PRESSURE: 70 MMHG | WEIGHT: 130 LBS | BODY MASS INDEX: 23.04 KG/M2 | SYSTOLIC BLOOD PRESSURE: 122 MMHG

## 2024-10-22 DIAGNOSIS — E10.9 TYPE 1 DIABETES MELLITUS WITHOUT COMPLICATION: ICD-10-CM

## 2024-10-22 DIAGNOSIS — E06.3 HASHIMOTO'S THYROIDITIS: ICD-10-CM

## 2024-10-22 DIAGNOSIS — Z11.3 SCREEN FOR STD (SEXUALLY TRANSMITTED DISEASE): ICD-10-CM

## 2024-10-22 DIAGNOSIS — Z01.419 WOMEN'S ANNUAL ROUTINE GYNECOLOGICAL EXAMINATION: Primary | ICD-10-CM

## 2024-10-22 LAB
ALBUMIN SERPL BCP-MCNC: 4.5 G/DL (ref 3.4–5)
ALP SERPL-CCNC: 107 U/L (ref 33–110)
ALT SERPL W P-5'-P-CCNC: 12 U/L (ref 7–45)
ANION GAP SERPL CALC-SCNC: 11 MMOL/L (ref 10–20)
AST SERPL W P-5'-P-CCNC: 14 U/L (ref 9–39)
BILIRUB SERPL-MCNC: 0.5 MG/DL (ref 0–1.2)
BUN SERPL-MCNC: 11 MG/DL (ref 6–23)
CALCIUM SERPL-MCNC: 9.6 MG/DL (ref 8.6–10.3)
CHLORIDE SERPL-SCNC: 103 MMOL/L (ref 98–107)
CHOLEST SERPL-MCNC: 251 MG/DL (ref 0–199)
CHOLESTEROL/HDL RATIO: 2.5
CO2 SERPL-SCNC: 25 MMOL/L (ref 21–32)
CREAT SERPL-MCNC: 0.74 MG/DL (ref 0.5–1.05)
CREAT UR-MCNC: 27.3 MG/DL (ref 20–320)
EGFRCR SERPLBLD CKD-EPI 2021: >90 ML/MIN/1.73M*2
GLUCOSE SERPL-MCNC: 96 MG/DL (ref 74–99)
HDLC SERPL-MCNC: 102.3 MG/DL
LDLC SERPL CALC-MCNC: 134 MG/DL
MICROALBUMIN UR-MCNC: <7 MG/L
MICROALBUMIN/CREAT UR: NORMAL MG/G{CREAT}
NON HDL CHOLESTEROL: 149 MG/DL (ref 0–149)
POTASSIUM SERPL-SCNC: 4 MMOL/L (ref 3.5–5.3)
PROT SERPL-MCNC: 7.2 G/DL (ref 6.4–8.2)
SODIUM SERPL-SCNC: 135 MMOL/L (ref 136–145)
T4 FREE SERPL-MCNC: 0.95 NG/DL (ref 0.61–1.12)
TRIGL SERPL-MCNC: 75 MG/DL (ref 0–149)
TSH SERPL-ACNC: 0.02 MIU/L (ref 0.44–3.98)
VLDL: 15 MG/DL (ref 0–40)

## 2024-10-22 PROCEDURE — 36415 COLL VENOUS BLD VENIPUNCTURE: CPT

## 2024-10-22 PROCEDURE — 84439 ASSAY OF FREE THYROXINE: CPT

## 2024-10-22 PROCEDURE — 3008F BODY MASS INDEX DOCD: CPT | Performed by: NURSE PRACTITIONER

## 2024-10-22 PROCEDURE — 87491 CHLMYD TRACH DNA AMP PROBE: CPT

## 2024-10-22 PROCEDURE — 82570 ASSAY OF URINE CREATININE: CPT

## 2024-10-22 PROCEDURE — 87661 TRICHOMONAS VAGINALIS AMPLIF: CPT

## 2024-10-22 PROCEDURE — 80061 LIPID PANEL: CPT

## 2024-10-22 PROCEDURE — 82043 UR ALBUMIN QUANTITATIVE: CPT

## 2024-10-22 PROCEDURE — 3078F DIAST BP <80 MM HG: CPT | Performed by: NURSE PRACTITIONER

## 2024-10-22 PROCEDURE — 3074F SYST BP LT 130 MM HG: CPT | Performed by: NURSE PRACTITIONER

## 2024-10-22 PROCEDURE — 83036 HEMOGLOBIN GLYCOSYLATED A1C: CPT

## 2024-10-22 PROCEDURE — 99395 PREV VISIT EST AGE 18-39: CPT | Performed by: NURSE PRACTITIONER

## 2024-10-22 PROCEDURE — 1036F TOBACCO NON-USER: CPT | Performed by: NURSE PRACTITIONER

## 2024-10-22 PROCEDURE — 84443 ASSAY THYROID STIM HORMONE: CPT

## 2024-10-22 PROCEDURE — 87591 N.GONORRHOEAE DNA AMP PROB: CPT

## 2024-10-22 PROCEDURE — 80053 COMPREHEN METABOLIC PANEL: CPT

## 2024-10-22 ASSESSMENT — ENCOUNTER SYMPTOMS
NEUROLOGICAL NEGATIVE: 1
CARDIOVASCULAR NEGATIVE: 1
GASTROINTESTINAL NEGATIVE: 1
MUSCULOSKELETAL NEGATIVE: 1
EYES NEGATIVE: 1
ENDOCRINE NEGATIVE: 1
PSYCHIATRIC NEGATIVE: 1
CONSTITUTIONAL NEGATIVE: 1
RESPIRATORY NEGATIVE: 1

## 2024-10-22 NOTE — PROGRESS NOTES
Subjective   Patient ID: Jennifer Hurst is a 29 y.o. female who presents for Annual Exam (Normal PAP 4/26/2023/Pargard inserted 12/13/2021).  29 year old here for annual exam without complaints.  She is due for her pap in 2026 as her last pap was normal in 2023.  She desires std testing but denies any symptoms.  She currently hs the paragard and is doing well.  She denies any other complaints.         Review of Systems   Constitutional: Negative.    HENT: Negative.     Eyes: Negative.    Respiratory: Negative.     Cardiovascular: Negative.    Gastrointestinal: Negative.    Endocrine: Negative.    Genitourinary: Negative.    Musculoskeletal: Negative.    Skin: Negative.    Neurological: Negative.    Psychiatric/Behavioral: Negative.         Objective   Physical Exam  Vitals reviewed.   Constitutional:       Appearance: Normal appearance. She is well-developed.   Pulmonary:      Effort: Pulmonary effort is normal. No respiratory distress.   Chest:   Breasts:     Breasts are symmetrical.      Right: Normal. No swelling, bleeding, inverted nipple, mass, nipple discharge, skin change or tenderness.      Left: Normal. No swelling, bleeding, inverted nipple, mass, nipple discharge, skin change or tenderness.   Abdominal:      Palpations: Abdomen is soft.   Genitourinary:     General: Normal vulva.      Exam position: Lithotomy position.      Pubic Area: No rash.       Labia:         Right: No rash, tenderness, lesion or injury.         Left: No rash, tenderness, lesion or injury.       Urethra: No prolapse, urethral pain, urethral swelling or urethral lesion.      Vagina: Normal.      Cervix: Normal.      Uterus: Normal.       Adnexa: Right adnexa normal and left adnexa normal.      Rectum: Normal.   Musculoskeletal:         General: Normal range of motion.   Lymphadenopathy:      Upper Body:      Right upper body: No supraclavicular, axillary or pectoral adenopathy.      Left upper body: No supraclavicular, axillary or  pectoral adenopathy.   Skin:     General: Skin is warm and dry.   Neurological:      General: No focal deficit present.      Mental Status: She is alert and oriented to person, place, and time. Mental status is at baseline.   Psychiatric:         Attention and Perception: Attention and perception normal.         Mood and Affect: Mood and affect normal.         Speech: Speech normal.         Behavior: Behavior normal. Behavior is cooperative.         Thought Content: Thought content normal.         Judgment: Judgment normal.         Assessment/Plan   Problem List Items Addressed This Visit             ICD-10-CM    Women's annual routine gynecological examination - Primary Z01.419     Other Visit Diagnoses         Codes    Screen for STD (sexually transmitted disease)     Z11.3    Relevant Orders    Neisseria gonorrhoeae, Amplified    Chlamydia trachomatis, Amplified    Trichomonas vaginalis, Amplified        Pap/hpv due 2026  Std testing sent  IUD in place  Follow up 1 year for annual sooner as needed         MICHAEL Omer-CNP 10/22/24 1:34 PM

## 2024-10-23 LAB
C TRACH RRNA SPEC QL NAA+PROBE: NEGATIVE
EST. AVERAGE GLUCOSE BLD GHB EST-MCNC: 131 MG/DL
HBA1C MFR BLD: 6.2 %
N GONORRHOEA DNA SPEC QL PROBE+SIG AMP: NEGATIVE
T VAGINALIS RRNA SPEC QL NAA+PROBE: NEGATIVE

## 2024-10-27 DIAGNOSIS — R79.89 LOW TSH LEVEL: Primary | ICD-10-CM

## 2024-10-27 NOTE — RESULT ENCOUNTER NOTE
Labs have been reviewed.  The A1C is 6.2%.  The cholesterol value is elevated, but medication is not yet warranted given age.  The kidney, liver and urine values are normal.  The TSH is too low with a normal FT4.  TSH suppression is not what is expected with Hashimoto's thyroiditis. Please obtain blood tests to rule out Grave's disease, which is another autoimmune disorder of the thyroid gland.

## 2024-11-04 ENCOUNTER — LAB (OUTPATIENT)
Dept: LAB | Facility: LAB | Age: 29
End: 2024-11-04
Payer: COMMERCIAL

## 2024-11-04 DIAGNOSIS — R79.89 LOW TSH LEVEL: ICD-10-CM

## 2024-11-04 PROCEDURE — 83519 RIA NONANTIBODY: CPT

## 2024-11-04 PROCEDURE — 84445 ASSAY OF TSI GLOBULIN: CPT

## 2024-11-04 PROCEDURE — 36415 COLL VENOUS BLD VENIPUNCTURE: CPT

## 2024-11-06 LAB — TSH RECEP AB SER-ACNC: 3.68 IU/L

## 2024-11-09 DIAGNOSIS — E10.9 TYPE 1 DIABETES MELLITUS WITHOUT COMPLICATION: ICD-10-CM

## 2024-11-11 RX ORDER — INSULIN LISPRO 100 [IU]/ML
INJECTION, SOLUTION INTRAVENOUS; SUBCUTANEOUS
Qty: 40 ML | Refills: 6 | Status: SHIPPED | OUTPATIENT
Start: 2024-11-11

## 2024-11-20 LAB — TSI SER-ACNC: 3.7 TSI INDEX

## 2024-11-27 DIAGNOSIS — E10.9 TYPE 1 DIABETES MELLITUS WITHOUT COMPLICATION: Primary | ICD-10-CM

## 2024-11-27 DIAGNOSIS — E10.9 TYPE 1 DIABETES MELLITUS WITHOUT COMPLICATION: ICD-10-CM

## 2024-11-27 RX ORDER — PEN NEEDLE, DIABETIC 29 G X1/2"
1 NEEDLE, DISPOSABLE MISCELLANEOUS 4 TIMES DAILY
Qty: 150 EACH | Refills: 11 | Status: SHIPPED | OUTPATIENT
Start: 2024-11-27 | End: 2025-11-27

## 2024-11-27 RX ORDER — PEN NEEDLE, DIABETIC 29 G X1/2"
1 NEEDLE, DISPOSABLE MISCELLANEOUS 4 TIMES DAILY
COMMUNITY
Start: 2024-05-14 | End: 2024-11-27 | Stop reason: SDUPTHER

## 2024-11-27 RX ORDER — SYRING-NEEDL,DISP,INSUL,0.3 ML 30 GX5/16"
SYRINGE, EMPTY DISPOSABLE MISCELLANEOUS
Qty: 100 EACH | Refills: 11 | Status: SHIPPED | OUTPATIENT
Start: 2024-11-27

## 2024-11-27 NOTE — TELEPHONE ENCOUNTER
Rx Refill Request      Medication Name: syringe needles  Days supply: 30     Specific Pharmacy location:  [facility]             Date of last appointment:  8/2/24  Date of next appointment:  2/3/25      Patient informed dr fry is out of office until 12/2/24 she states she does not have enough to last until next week.

## 2024-11-30 DIAGNOSIS — E05.00 GRAVES DISEASE: Primary | ICD-10-CM

## 2024-11-30 RX ORDER — METHIMAZOLE 5 MG/1
2.5 TABLET ORAL DAILY
Qty: 15 TABLET | Refills: 5 | Status: SHIPPED | OUTPATIENT
Start: 2024-11-30 | End: 2025-05-29

## 2024-12-26 DIAGNOSIS — E10.9 TYPE 1 DIABETES MELLITUS WITHOUT COMPLICATION: ICD-10-CM

## 2024-12-26 RX ORDER — INSULIN GLARGINE 100 [IU]/ML
26 INJECTION, SOLUTION SUBCUTANEOUS EVERY MORNING
Qty: 10 EACH | Refills: 5 | Status: SHIPPED | OUTPATIENT
Start: 2024-12-26 | End: 2025-06-24

## 2024-12-26 RX ORDER — PEN NEEDLE, DIABETIC 30 GX3/16"
1 NEEDLE, DISPOSABLE MISCELLANEOUS
Qty: 400 EACH | Refills: 3 | Status: SHIPPED | OUTPATIENT
Start: 2024-12-26 | End: 2025-12-26

## 2025-01-29 LAB
ALBUMIN SERPL-MCNC: 4.4 G/DL (ref 3.6–5.1)
ALBUMIN/GLOB SERPL: 2 (CALC) (ref 1–2.5)
ALP SERPL-CCNC: 94 U/L (ref 31–125)
ALT SERPL-CCNC: 10 U/L (ref 6–29)
AST SERPL-CCNC: 13 U/L (ref 10–30)
BILIRUB DIRECT SERPL-MCNC: 0.1 MG/DL
BILIRUB INDIRECT SERPL-MCNC: 0.5 MG/DL (CALC) (ref 0.2–1.2)
BILIRUB SERPL-MCNC: 0.6 MG/DL (ref 0.2–1.2)
ERYTHROCYTE [DISTWIDTH] IN BLOOD BY AUTOMATED COUNT: 12.5 % (ref 11–15)
GLOBULIN SER CALC-MCNC: 2.2 G/DL (CALC) (ref 1.9–3.7)
HCT VFR BLD AUTO: 39 % (ref 35–45)
HGB BLD-MCNC: 12.7 G/DL (ref 11.7–15.5)
MCH RBC QN AUTO: 29.2 PG (ref 27–33)
MCHC RBC AUTO-ENTMCNC: 32.6 G/DL (ref 32–36)
MCV RBC AUTO: 89.7 FL (ref 80–100)
PLATELET # BLD AUTO: 279 THOUSAND/UL (ref 140–400)
PMV BLD REES-ECKER: 9.7 FL (ref 7.5–12.5)
PROT SERPL-MCNC: 6.6 G/DL (ref 6.1–8.1)
RBC # BLD AUTO: 4.35 MILLION/UL (ref 3.8–5.1)
TSH SERPL-ACNC: 0.55 MIU/L
WBC # BLD AUTO: 5.8 THOUSAND/UL (ref 3.8–10.8)

## 2025-02-03 ENCOUNTER — APPOINTMENT (OUTPATIENT)
Dept: ENDOCRINOLOGY | Facility: CLINIC | Age: 30
End: 2025-02-03
Payer: COMMERCIAL

## 2025-02-03 VITALS
SYSTOLIC BLOOD PRESSURE: 112 MMHG | BODY MASS INDEX: 23.96 KG/M2 | DIASTOLIC BLOOD PRESSURE: 74 MMHG | WEIGHT: 135.2 LBS | HEIGHT: 63 IN | HEART RATE: 82 BPM

## 2025-02-03 DIAGNOSIS — E10.9 TYPE 1 DIABETES MELLITUS WITHOUT COMPLICATION: Primary | ICD-10-CM

## 2025-02-03 DIAGNOSIS — E05.00 GRAVES DISEASE: ICD-10-CM

## 2025-02-03 DIAGNOSIS — Z79.4 LONG-TERM INSULIN USE (MULTI): ICD-10-CM

## 2025-02-03 LAB — POC HEMOGLOBIN A1C: 6.3 % (ref 4.2–6.5)

## 2025-02-03 PROCEDURE — 3074F SYST BP LT 130 MM HG: CPT | Performed by: INTERNAL MEDICINE

## 2025-02-03 PROCEDURE — 83036 HEMOGLOBIN GLYCOSYLATED A1C: CPT | Performed by: INTERNAL MEDICINE

## 2025-02-03 PROCEDURE — 3008F BODY MASS INDEX DOCD: CPT | Performed by: INTERNAL MEDICINE

## 2025-02-03 PROCEDURE — 95251 CONT GLUC MNTR ANALYSIS I&R: CPT | Performed by: INTERNAL MEDICINE

## 2025-02-03 PROCEDURE — 99214 OFFICE O/P EST MOD 30 MIN: CPT | Performed by: INTERNAL MEDICINE

## 2025-02-03 PROCEDURE — 3078F DIAST BP <80 MM HG: CPT | Performed by: INTERNAL MEDICINE

## 2025-02-03 RX ORDER — BLOOD-GLUCOSE SENSOR
EACH MISCELLANEOUS
COMMUNITY
Start: 2025-01-09 | End: 2025-02-07 | Stop reason: SDUPTHER

## 2025-02-03 NOTE — PATIENT INSTRUCTIONS
Thank you for choosing [facility]  for your health care needs.  If you have any questions, concerns or medical needs, please feel free to contact our office at [phone].    Please ensure you complete your blood work one week before the next scheduled appointment.    To continue Insulin Glargine 26  units subcutaneous daily in the morning   Novolog 1 unit for every 10 grams   Correction scale 1 unit for every 50mg above 150mg/dL   To continue the use of your CGM for the intensive glucose monitoring due to the dynamic nature of insulin requirements, the narrow therapeutic index of insulin and the potentially fatal consequences of treatment  To continue Methimazole 2.5mg once daily   For follow up in 4 months

## 2025-02-03 NOTE — PROGRESS NOTES
Subjective   Jennifer Hurst is a 29 y.o. female who presents for follow up for Type 1 diabetes mellitus. The initial diagnosis of diabetes was made at age 12 years. The patient’s diagnosis of diabetes mellitus was based on a blood test obtain by her pediatrician which was markedly elevated.  Both of her grandmothers were diabetics but had type II disease.    She has had no major changes to her health since her last appointment.  She did go to Urgent Care in December for URTI.     She had a four day period where her sensor was giving low values but glucose was in the 80s    Known complications due to diabetes included none    Cardiovascular risk factors include diabetes mellitus. The patient is not on an ACE inhibitor or angiotensin II receptor blocker.  The patient has not been previously hospitalized due to diabetic ketoacidosis.     Current symptoms/problems include none. Her clinical course has been stable.     Current diabetes regimen:  Insulin glargine 26 units daily  NovoLog 1 unit for every 10 g of carbs plus correction scale     The patient is currently checking the blood glucose multiple times per day.    Patient is using: continuous glucose monitor                                                                Hypoglycemia frequency: 5% (increased from 8%)  Hypoglycemia awareness: Yes     MEALS:  Breakfast - omits   1pm - toast, salad, leftovers   Dinner - chicken, vegetable, rice  Beverages- coffee, tea with creamer     She was diagnosed with Graves' disease in November 2024.    Current Regimen  Methimazole 2.5mg once daily     Symptoms related to thyroid disease are as listed below:  Energy levels -  improving   Sleep changes - disrupted by nocturia x 1   Temperature intolerances -  denies   Bowel movements -  daily or every 2 days   Hair changes -  denies  Skin changes -  denies   Palpitations - denies   Tremors -denies   Dysphagia - denies   Dyspnea upon lying supine -  denies  Dysphonia -  denies  "  Weight changes -  gained       Review of Systems   All other systems reviewed and are negative.    Objective   /74   Pulse 82   Ht 1.6 m (5' 3\")   Wt 61.3 kg (135 lb 3.2 oz)   BMI 23.95 kg/m²   Physical Exam  Vitals and nursing note reviewed.   Constitutional:       General: She is not in acute distress.     Appearance: Normal appearance. She is normal weight.   HENT:      Head: Normocephalic and atraumatic.      Nose: Nose normal.      Mouth/Throat:      Mouth: Mucous membranes are moist.   Eyes:      Extraocular Movements: Extraocular movements intact.   Neck:      Thyroid: Thyromegaly present. No thyroid tenderness.   Cardiovascular:      Rate and Rhythm: Normal rate and regular rhythm.   Pulmonary:      Effort: Pulmonary effort is normal.      Breath sounds: Normal breath sounds.   Musculoskeletal:         General: Normal range of motion.   Skin:     General: Skin is warm.   Neurological:      Mental Status: She is alert and oriented to person, place, and time.      Comments: Tremors to outstretched hands    Psychiatric:         Mood and Affect: Mood normal.         Lab Review  Lab Results   Component Value Date    HGBA1C 6.3 02/03/2025     Lab Results   Component Value Date    GLUCOSE 96 10/22/2024    CALCIUM 9.6 10/22/2024     (L) 10/22/2024    K 4.0 10/22/2024    CO2 25 10/22/2024     10/22/2024    BUN 11 10/22/2024    CREATININE 0.74 10/22/2024     Lab Results   Component Value Date    CHOL 251 (H) 10/22/2024    CHOL 212 (H) 04/10/2023    CHOL 231 (H) 04/08/2023     Lab Results   Component Value Date    .3 10/22/2024    HDL 89.1 04/10/2023    HDL 87.7 04/08/2023     Lab Results   Component Value Date    LDLCALC 134 (H) 10/22/2024     Lab Results   Component Value Date    TRIG 75 10/22/2024    TRIG 102 04/10/2023    TRIG 151 (H) 04/08/2023     Lab Results   Component Value Date    TSH 0.55 01/28/2025    THYROIDPAB 303 (H) 11/08/2023     Lab Results   Component Value Date    ALT " 10 01/28/2025    AST 13 01/28/2025    ALKPHOS 94 01/28/2025    BILITOT 0.6 01/28/2025     Lab Results   Component Value Date    WBC 5.8 01/28/2025    HGB 12.7 01/28/2025    HCT 39.0 01/28/2025    MCV 89.7 01/28/2025     01/28/2025         Component  Ref Range & Units 3 mo ago   TSH Receptor AB  <=1.75 IU/L 3.68 High         Component  Ref Range & Units 3 mo ago   TSI  <=1.3 TSI index 3.7 High      Health Maintenance:   Foot Exam: December 2023  Eye Exam:  December 2022  Urine Albumin: October 22, 2024    CGM Interpretation  14 day CGM download was reviewed in detail as documented above and will be attached to chart.  A minimum of 72 hours of glucose data was used to inform the management plan outlined below.    Sufficient data to analyze:  Yes; CGM active 91% of the time  25% of values is above target range, which is at goal.    70% of values is spent in target range, and thus at goal   5% of values is spent with hypoglycemia, and thus is slightly elevated goal   Episodes of hypoglycemia seems to be to an erroneous sensor which she eventually changed      Assessment/Plan   29-year-old female presents for follow up for type 1 diabetes. Her blood pressure is at goal today.    She was also diagnosed with Graves' disease in November 2024.  She is clinically and biochemically euthyroid.    Type 1 diabetes mellitus (Multi)  To continue Insulin Glargine 26  units subcutaneous daily in the morning   Novolog 1 unit for every 10 grams   Correction scale 1 unit for every 50mg above 150mg/dL   To continue the use of your CGM for the intensive glucose monitoring due to the dynamic nature of insulin requirements, the narrow therapeutic index of insulin and the potentially fatal consequences of treatment  Counseled that the time in range should be >70%  The A1C is at goal       Long-term insulin use (Multi)  Please rotate insulin injection sites      Graves disease  To continue Methimazole 2.5mg once daily   Counseled that  Graves' disease can very well go into remission after 2 years of medical therapy       For follow up in 4 months

## 2025-02-07 PROBLEM — E05.00 GRAVES DISEASE: Status: ACTIVE | Noted: 2025-02-07

## 2025-02-07 RX ORDER — METHIMAZOLE 5 MG/1
2.5 TABLET ORAL DAILY
Qty: 15 TABLET | Refills: 5 | Status: SHIPPED | OUTPATIENT
Start: 2025-02-07 | End: 2025-08-06

## 2025-02-07 RX ORDER — INSULIN LISPRO 100 [IU]/ML
INJECTION, SOLUTION INTRAVENOUS; SUBCUTANEOUS
Qty: 40 ML | Refills: 6 | Status: SHIPPED | OUTPATIENT
Start: 2025-02-07

## 2025-02-07 RX ORDER — INSULIN GLARGINE 100 [IU]/ML
26 INJECTION, SOLUTION SUBCUTANEOUS EVERY MORNING
Qty: 10 EACH | Refills: 5 | Status: SHIPPED | OUTPATIENT
Start: 2025-02-07 | End: 2025-08-06

## 2025-02-07 RX ORDER — BLOOD-GLUCOSE SENSOR
EACH MISCELLANEOUS
Qty: 2 EACH | Refills: 11 | Status: SHIPPED | OUTPATIENT
Start: 2025-02-07

## 2025-02-07 NOTE — ASSESSMENT & PLAN NOTE
To continue Insulin Glargine 26  units subcutaneous daily in the morning   Novolog 1 unit for every 10 grams   Correction scale 1 unit for every 50mg above 150mg/dL   To continue the use of your CGM for the intensive glucose monitoring due to the dynamic nature of insulin requirements, the narrow therapeutic index of insulin and the potentially fatal consequences of treatment  Counseled that the time in range should be >70%  The A1C is at goal

## 2025-02-07 NOTE — ASSESSMENT & PLAN NOTE
To continue Methimazole 2.5mg once daily   Counseled that Graves' disease can very well go into remission after 2 years of medical therapy       For follow up in 4 months

## 2025-06-07 DIAGNOSIS — E10.9 TYPE 1 DIABETES MELLITUS WITHOUT COMPLICATION: ICD-10-CM

## 2025-06-07 DIAGNOSIS — E05.00 GRAVES DISEASE: ICD-10-CM

## 2025-06-14 LAB
ALBUMIN SERPL-MCNC: 4.5 G/DL (ref 3.6–5.1)
ALP SERPL-CCNC: 81 U/L (ref 31–125)
ALT SERPL-CCNC: 8 U/L (ref 6–29)
ANION GAP SERPL CALCULATED.4IONS-SCNC: 6 MMOL/L (CALC) (ref 7–17)
AST SERPL-CCNC: 11 U/L (ref 10–30)
BILIRUB SERPL-MCNC: 0.6 MG/DL (ref 0.2–1.2)
BUN SERPL-MCNC: 10 MG/DL (ref 7–25)
CALCIUM SERPL-MCNC: 9.3 MG/DL (ref 8.6–10.2)
CHLORIDE SERPL-SCNC: 103 MMOL/L (ref 98–110)
CO2 SERPL-SCNC: 26 MMOL/L (ref 20–32)
CREAT SERPL-MCNC: 0.85 MG/DL (ref 0.5–0.97)
EGFRCR SERPLBLD CKD-EPI 2021: 94 ML/MIN/1.73M2
ERYTHROCYTE [DISTWIDTH] IN BLOOD BY AUTOMATED COUNT: 12.5 % (ref 11–15)
EST. AVERAGE GLUCOSE BLD GHB EST-MCNC: 126 MG/DL
EST. AVERAGE GLUCOSE BLD GHB EST-SCNC: 7 MMOL/L
GLUCOSE SERPL-MCNC: 162 MG/DL (ref 65–99)
HBA1C MFR BLD: 6 %
HCT VFR BLD AUTO: 39 % (ref 35–45)
HGB BLD-MCNC: 12.6 G/DL (ref 11.7–15.5)
MCH RBC QN AUTO: 29.7 PG (ref 27–33)
MCHC RBC AUTO-ENTMCNC: 32.3 G/DL (ref 32–36)
MCV RBC AUTO: 92 FL (ref 80–100)
PLATELET # BLD AUTO: 310 THOUSAND/UL (ref 140–400)
PMV BLD REES-ECKER: 9.4 FL (ref 7.5–12.5)
POTASSIUM SERPL-SCNC: 4.3 MMOL/L (ref 3.5–5.3)
PROT SERPL-MCNC: 6.7 G/DL (ref 6.1–8.1)
RBC # BLD AUTO: 4.24 MILLION/UL (ref 3.8–5.1)
SODIUM SERPL-SCNC: 135 MMOL/L (ref 135–146)
TSH SERPL-ACNC: 0.66 MIU/L
WBC # BLD AUTO: 4.9 THOUSAND/UL (ref 3.8–10.8)

## 2025-06-25 ENCOUNTER — APPOINTMENT (OUTPATIENT)
Dept: ENDOCRINOLOGY | Facility: CLINIC | Age: 30
End: 2025-06-25
Payer: COMMERCIAL

## 2025-06-25 VITALS
BODY MASS INDEX: 23.6 KG/M2 | SYSTOLIC BLOOD PRESSURE: 108 MMHG | DIASTOLIC BLOOD PRESSURE: 74 MMHG | WEIGHT: 133.2 LBS | HEART RATE: 76 BPM | HEIGHT: 63 IN

## 2025-06-25 DIAGNOSIS — E10.9 TYPE 1 DIABETES MELLITUS WITHOUT COMPLICATION: ICD-10-CM

## 2025-06-25 DIAGNOSIS — E05.00 GRAVES DISEASE: ICD-10-CM

## 2025-06-25 DIAGNOSIS — Z79.4 LONG-TERM INSULIN USE (MULTI): Primary | ICD-10-CM

## 2025-06-25 PROCEDURE — 99214 OFFICE O/P EST MOD 30 MIN: CPT | Performed by: INTERNAL MEDICINE

## 2025-06-25 PROCEDURE — 3074F SYST BP LT 130 MM HG: CPT | Performed by: INTERNAL MEDICINE

## 2025-06-25 PROCEDURE — 3008F BODY MASS INDEX DOCD: CPT | Performed by: INTERNAL MEDICINE

## 2025-06-25 PROCEDURE — 3044F HG A1C LEVEL LT 7.0%: CPT | Performed by: INTERNAL MEDICINE

## 2025-06-25 PROCEDURE — 3078F DIAST BP <80 MM HG: CPT | Performed by: INTERNAL MEDICINE

## 2025-06-25 PROCEDURE — 95251 CONT GLUC MNTR ANALYSIS I&R: CPT | Performed by: INTERNAL MEDICINE

## 2025-06-25 RX ORDER — METHIMAZOLE 5 MG/1
2.5 TABLET ORAL DAILY
Qty: 15 TABLET | Refills: 5 | Status: SHIPPED | OUTPATIENT
Start: 2025-06-25 | End: 2025-12-22

## 2025-06-25 NOTE — PROGRESS NOTES
Subjective   Jennifer Hurst is a 30 y.o. female who presents for follow up for Type 1 diabetes mellitus. The initial diagnosis of diabetes was made at age 12 years. The patient’s diagnosis of diabetes mellitus was based on a blood test obtain by her pediatrician which was markedly elevated.  Both of her grandmothers were diabetics but had type II disease.    She has had no major changes to her health since her last appointment.      Known complications due to diabetes included none    Cardiovascular risk factors include diabetes mellitus. The patient is not on an ACE inhibitor or angiotensin II receptor blocker.  The patient has not been previously hospitalized due to diabetic ketoacidosis.     Current symptoms/problems include none. Her clinical course has been stable.     Current diabetes regimen:  Insulin glargine 26 units daily  NovoLog 1 unit for every 10 g of carbs plus correction scale     The patient is currently checking the blood glucose multiple times per day.    Patient is using: continuous glucose monitor                                                            Hypoglycemia frequency: 14% (increased from 5%)  Hypoglycemia awareness: Yes     MEALS:  Breakfast - omits   1-2pm -hot dog, peanut utter and jelly, leftovers   Dinner - chicken, vegetable, rice  Beverages- coffee, tea with creamer     She was diagnosed with Graves' disease in November 2024.    Current Regimen  Methimazole 2.5mg once daily     Symptoms related to thyroid disease are as listed below:  Energy levels -  good    Sleep changes - disrupted by nocturia x 1   Temperature intolerances -  denies   Bowel movements -  regular; daily or every 2 days   Hair changes -  denies  Skin changes -  denies   Palpitations - denies   Tremors -denies   Dysphagia - denies   Dyspnea upon lying supine -  denies  Dysphonia -  denies   Weight changes -  denies        Review of Systems   All other systems reviewed and are negative.    Objective   /74  "  Pulse 76   Ht 1.6 m (5' 3\")   Wt 60.4 kg (133 lb 3.2 oz)   BMI 23.60 kg/m²   Physical Exam  Vitals and nursing note reviewed.   Constitutional:       General: She is not in acute distress.     Appearance: Normal appearance. She is normal weight.   HENT:      Head: Normocephalic and atraumatic.      Nose: Nose normal.      Mouth/Throat:      Mouth: Mucous membranes are moist.   Eyes:      Extraocular Movements: Extraocular movements intact.   Neck:      Thyroid: Thyromegaly present. No thyroid tenderness.   Cardiovascular:      Rate and Rhythm: Normal rate and regular rhythm.   Pulmonary:      Effort: Pulmonary effort is normal.      Breath sounds: Normal breath sounds.   Musculoskeletal:         General: Normal range of motion.   Skin:     General: Skin is warm.   Neurological:      Mental Status: She is alert and oriented to person, place, and time.      Comments: Tremors to outstretched hands    Psychiatric:         Mood and Affect: Mood normal.         Lab Review  Lab Results   Component Value Date    HGBA1C 6.0 (H) 06/13/2025     Lab Results   Component Value Date    GLUCOSE 162 (H) 06/13/2025    CALCIUM 9.3 06/13/2025     06/13/2025    K 4.3 06/13/2025    CO2 26 06/13/2025     06/13/2025    BUN 10 06/13/2025    CREATININE 0.85 06/13/2025     Lab Results   Component Value Date    CHOL 251 (H) 10/22/2024    CHOL 212 (H) 04/10/2023    CHOL 231 (H) 04/08/2023     Lab Results   Component Value Date    .3 10/22/2024    HDL 89.1 04/10/2023    HDL 87.7 04/08/2023     Lab Results   Component Value Date    LDLCALC 134 (H) 10/22/2024     Lab Results   Component Value Date    TRIG 75 10/22/2024    TRIG 102 04/10/2023    TRIG 151 (H) 04/08/2023     Lab Results   Component Value Date    TSH 0.66 06/13/2025    THYROIDPAB 303 (H) 11/08/2023     Lab Results   Component Value Date    ALT 8 06/13/2025    AST 11 06/13/2025    ALKPHOS 81 06/13/2025    BILITOT 0.6 06/13/2025     Lab Results   Component Value " Date    WBC 4.9 06/13/2025    HGB 12.6 06/13/2025    HCT 39.0 06/13/2025    MCV 92.0 06/13/2025     06/13/2025         Component  Ref Range & Units 3 mo ago   TSH Receptor AB  <=1.75 IU/L 3.68 High         Component  Ref Range & Units 3 mo ago   TSI  <=1.3 TSI index 3.7 High      Health Maintenance:   Foot Exam: December 2023  Eye Exam:  December 2022; scheduled for September   Urine Albumin: October 22, 2024    CGM Interpretation  14 day CGM download was reviewed in detail as documented above and will be attached to chart.  A minimum of 72 hours of glucose data was used to inform the management plan outlined below.    Sufficient data to analyze:  Yes; CGM active 94% of the time  16% of values is above target range, which is at goal.    70% of values is spent in target range, and thus at goal   14% of values is spent with hypoglycemia, and thus is slightly elevated goal       Assessment/Plan   30-year-old female presents for follow up for type 1 diabetes. Her blood pressure is at goal today.    She was also diagnosed with Graves' disease in November 2024.      Type 1 diabetes mellitus (Multi)  To decrease Insulin Glargine to 22  units subcutaneous daily in the morning   To change Novolog to 1 unit for every 12 grams   Correction scale 1 unit for every 50mg above 150mg/dL   To continue the use of your CGM for the intensive glucose monitoring due to the dynamic nature of insulin requirements, the narrow therapeutic index of insulin and the potentially fatal consequences of treatment  Counseled that the degree of hypoglycemia should be kept to 4% or less  Counseled that the time in range should be >70%    Long-term insulin use (Multi)  Please rotate insulin injection sites  Insulin adjustments as noted above to decrease the degree of hypoglycemia     Graves disease  To continue Methimazole 2.5mg once daily   To obtain TFTs before the next appointment     For follow up in 6 months

## 2025-06-25 NOTE — PATIENT INSTRUCTIONS
Thank you for choosing [facility]  for your health care needs.  If you have any questions, concerns or medical needs, please feel free to contact our office at [phone].    Please ensure you complete your blood work one week before the next scheduled appointment.    To decrease Insulin Glargine to 22  units subcutaneous daily in the morning   To change Novolog to 1 unit for every 12 grams   Correction scale 1 unit for every 50mg above 150mg/dL   To continue the use of your CGM for the intensive glucose monitoring due to the dynamic nature of insulin requirements, the narrow therapeutic index of insulin and the potentially fatal consequences of treatment  Counseled that the degree of hypoglycemia should be kept to 4% or less  To continue Methimazole 2.5mg once daily   For follow up in 6 months

## 2025-06-30 RX ORDER — INSULIN LISPRO 100 [IU]/ML
INJECTION, SOLUTION INTRAVENOUS; SUBCUTANEOUS
Qty: 40 ML | Refills: 6 | Status: SHIPPED | OUTPATIENT
Start: 2025-06-30

## 2025-06-30 RX ORDER — INSULIN GLARGINE 100 [IU]/ML
22 INJECTION, SOLUTION SUBCUTANEOUS EVERY MORNING
Qty: 10 EACH | Refills: 5 | Status: SHIPPED | OUTPATIENT
Start: 2025-06-30 | End: 2025-12-27

## 2025-06-30 NOTE — ASSESSMENT & PLAN NOTE
To decrease Insulin Glargine to 22  units subcutaneous daily in the morning   To change Novolog to 1 unit for every 12 grams   Correction scale 1 unit for every 50mg above 150mg/dL   To continue the use of your CGM for the intensive glucose monitoring due to the dynamic nature of insulin requirements, the narrow therapeutic index of insulin and the potentially fatal consequences of treatment  Counseled that the degree of hypoglycemia should be kept to 4% or less  Counseled that the time in range should be >70%

## 2025-06-30 NOTE — ASSESSMENT & PLAN NOTE
Please rotate insulin injection sites  Insulin adjustments as noted above to decrease the degree of hypoglycemia

## 2025-06-30 NOTE — ASSESSMENT & PLAN NOTE
To continue Methimazole 2.5mg once daily   To obtain TFTs before the next appointment     For follow up in 6 months

## 2025-10-22 ENCOUNTER — APPOINTMENT (OUTPATIENT)
Dept: OBSTETRICS AND GYNECOLOGY | Facility: CLINIC | Age: 30
End: 2025-10-22
Payer: COMMERCIAL

## 2025-11-05 ENCOUNTER — APPOINTMENT (OUTPATIENT)
Dept: OBSTETRICS AND GYNECOLOGY | Facility: CLINIC | Age: 30
End: 2025-11-05
Payer: COMMERCIAL

## 2026-01-15 ENCOUNTER — APPOINTMENT (OUTPATIENT)
Dept: ENDOCRINOLOGY | Facility: CLINIC | Age: 31
End: 2026-01-15
Payer: COMMERCIAL